# Patient Record
Sex: FEMALE | Race: WHITE | NOT HISPANIC OR LATINO
[De-identification: names, ages, dates, MRNs, and addresses within clinical notes are randomized per-mention and may not be internally consistent; named-entity substitution may affect disease eponyms.]

---

## 2017-07-06 ENCOUNTER — APPOINTMENT (OUTPATIENT)
Dept: OBGYN | Facility: CLINIC | Age: 67
End: 2017-07-06

## 2017-07-06 VITALS
DIASTOLIC BLOOD PRESSURE: 70 MMHG | BODY MASS INDEX: 40.72 KG/M2 | WEIGHT: 181 LBS | HEIGHT: 56 IN | SYSTOLIC BLOOD PRESSURE: 124 MMHG

## 2017-07-06 LAB — HEMOCCULT SP1 STL QL: NEGATIVE

## 2017-07-06 RX ORDER — RALOXIFENE HYDROCHLORIDE 60 MG/1
TABLET ORAL
Refills: 0 | Status: ACTIVE | COMMUNITY

## 2017-07-31 ENCOUNTER — RX RENEWAL (OUTPATIENT)
Age: 67
End: 2017-07-31

## 2018-01-24 ENCOUNTER — RX RENEWAL (OUTPATIENT)
Age: 68
End: 2018-01-24

## 2018-03-09 ENCOUNTER — OUTPATIENT (OUTPATIENT)
Dept: OUTPATIENT SERVICES | Facility: HOSPITAL | Age: 68
LOS: 1 days | End: 2018-03-09
Payer: MEDICARE

## 2018-03-09 ENCOUNTER — APPOINTMENT (OUTPATIENT)
Dept: MRI IMAGING | Facility: CLINIC | Age: 68
End: 2018-03-09
Payer: MEDICARE

## 2018-03-09 DIAGNOSIS — Z90.710 ACQUIRED ABSENCE OF BOTH CERVIX AND UTERUS: Chronic | ICD-10-CM

## 2018-03-09 DIAGNOSIS — Z98.89 OTHER SPECIFIED POSTPROCEDURAL STATES: Chronic | ICD-10-CM

## 2018-03-09 DIAGNOSIS — D32.0 BENIGN NEOPLASM OF CEREBRAL MENINGES: Chronic | ICD-10-CM

## 2018-03-09 DIAGNOSIS — Z00.8 ENCOUNTER FOR OTHER GENERAL EXAMINATION: ICD-10-CM

## 2018-03-09 DIAGNOSIS — Z96.659 PRESENCE OF UNSPECIFIED ARTIFICIAL KNEE JOINT: Chronic | ICD-10-CM

## 2018-03-09 PROCEDURE — A9585: CPT

## 2018-03-09 PROCEDURE — 70553 MRI BRAIN STEM W/O & W/DYE: CPT | Mod: 26

## 2018-03-09 PROCEDURE — 70553 MRI BRAIN STEM W/O & W/DYE: CPT

## 2018-05-01 ENCOUNTER — RX RENEWAL (OUTPATIENT)
Age: 68
End: 2018-05-01

## 2018-10-08 ENCOUNTER — OUTPATIENT (OUTPATIENT)
Dept: OUTPATIENT SERVICES | Facility: HOSPITAL | Age: 68
LOS: 1 days | End: 2018-10-08
Payer: MEDICARE

## 2018-10-08 ENCOUNTER — APPOINTMENT (OUTPATIENT)
Dept: MRI IMAGING | Facility: CLINIC | Age: 68
End: 2018-10-08
Payer: MEDICARE

## 2018-10-08 DIAGNOSIS — Z98.89 OTHER SPECIFIED POSTPROCEDURAL STATES: Chronic | ICD-10-CM

## 2018-10-08 DIAGNOSIS — D32.0 BENIGN NEOPLASM OF CEREBRAL MENINGES: Chronic | ICD-10-CM

## 2018-10-08 DIAGNOSIS — Z00.8 ENCOUNTER FOR OTHER GENERAL EXAMINATION: ICD-10-CM

## 2018-10-08 DIAGNOSIS — Z96.659 PRESENCE OF UNSPECIFIED ARTIFICIAL KNEE JOINT: Chronic | ICD-10-CM

## 2018-10-08 DIAGNOSIS — Z90.710 ACQUIRED ABSENCE OF BOTH CERVIX AND UTERUS: Chronic | ICD-10-CM

## 2018-10-08 PROCEDURE — 70551 MRI BRAIN STEM W/O DYE: CPT

## 2018-10-08 PROCEDURE — 70551 MRI BRAIN STEM W/O DYE: CPT | Mod: 26

## 2019-01-25 ENCOUNTER — MEDICATION RENEWAL (OUTPATIENT)
Age: 69
End: 2019-01-25

## 2019-02-25 ENCOUNTER — RX RENEWAL (OUTPATIENT)
Age: 69
End: 2019-02-25

## 2019-03-12 ENCOUNTER — APPOINTMENT (OUTPATIENT)
Dept: OBGYN | Facility: CLINIC | Age: 69
End: 2019-03-12
Payer: MEDICARE

## 2019-03-12 VITALS
DIASTOLIC BLOOD PRESSURE: 76 MMHG | HEIGHT: 56 IN | BODY MASS INDEX: 39.37 KG/M2 | WEIGHT: 175 LBS | SYSTOLIC BLOOD PRESSURE: 112 MMHG

## 2019-03-12 DIAGNOSIS — M85.80 OTHER SPECIFIED DISORDERS OF BONE DENSITY AND STRUCTURE, UNSPECIFIED SITE: ICD-10-CM

## 2019-03-12 LAB
BILIRUB UR QL STRIP: NORMAL
CLARITY UR: CLEAR
COLLECTION METHOD: NORMAL
GLUCOSE UR-MCNC: NORMAL
HCG UR QL: 0.2 EU/DL
HEMOCCULT SP1 STL QL: NEGATIVE
HGB UR QL STRIP.AUTO: NORMAL
KETONES UR-MCNC: ABNORMAL
LEUKOCYTE ESTERASE UR QL STRIP: NORMAL
NITRITE UR QL STRIP: NORMAL
PH UR STRIP: 5.5
PROT UR STRIP-MCNC: NORMAL
SP GR UR STRIP: 1.01

## 2019-03-12 PROCEDURE — 81003 URINALYSIS AUTO W/O SCOPE: CPT | Mod: QW

## 2019-03-12 PROCEDURE — 99214 OFFICE O/P EST MOD 30 MIN: CPT

## 2019-03-12 PROCEDURE — 82270 OCCULT BLOOD FECES: CPT

## 2019-03-12 RX ORDER — OMEPRAZOLE MAGNESIUM 20 MG/1
20 CAPSULE, DELAYED RELEASE ORAL
Refills: 0 | Status: ACTIVE | COMMUNITY

## 2019-03-12 RX ORDER — ASPIRIN ENTERIC COATED TABLETS 81 MG 81 MG/1
81 TABLET, DELAYED RELEASE ORAL DAILY
Qty: 90 | Refills: 3 | Status: ACTIVE | COMMUNITY
Start: 1900-01-01 | End: 1900-01-01

## 2019-03-12 RX ORDER — CA/D3/MAG OX/ZINC/COP/MANG/BOR 600 MG-800
250 MCG TABLET,CHEWABLE ORAL
Refills: 0 | Status: ACTIVE | COMMUNITY
Start: 2019-03-12

## 2019-03-12 NOTE — HISTORY OF PRESENT ILLNESS
[1 Year Ago] : 1 year ago [Fair] : being in fair health [Healthy Diet] : a healthy diet [Weight Concerns] : weight concens [Last Mammogram ___] : Last Mammogram was [unfilled] [Last Bone Density ___] : Last bone density studies [unfilled] [Last Colonoscopy ___] : Last colonoscopy [unfilled] [Last Pap ___] : Last cervical pap smear was [unfilled] [Postmenopausal] : is postmenopausal [Definite:  ___ (Date)] : the last menstrual period was [unfilled] [Currently In Menopause] : currently in menopause [Experiencing Menopausal Sxs] : experiencing menopausal symptoms [Regular Exercise] : not exercising regularly [Hot Flashes] : no hot flashes [Night Sweats] : no night sweats [FreeTextEntry8] : NOT S/A WITH  [Sexually Active] : is not sexually active [de-identified] : , IS 10 YEARS OLDER

## 2019-03-12 NOTE — PHYSICAL EXAM
[Awake] : awake [Alert] : alert [Soft] : soft [Obese] : obese [Oriented x3] : oriented to person, place, and time [Normal] : urethra [No Bleeding] : there was no active vaginal bleeding [Absent] : absent [Uterine Adnexae] : were not tender and not enlarged [Nl Sphincter Tone] : normal sphincter tone [External Hemorrhoid] : an external hemorrhoid [Acute Distress] : no acute distress [Mass] : no breast mass [Nipple Discharge] : no nipple discharge [Axillary LAD] : no axillary lymphadenopathy [Tender] : non tender [Vulvar Atrophy] : vulvar atrophy [Atrophy] : atrophy [Occult Blood] : occult blood test from digital rectal exam was negative [FreeTextEntry4] : NARROW SPEC USED, UNCOMFORTABLE

## 2019-03-18 LAB — CYTOLOGY CVX/VAG DOC THIN PREP: NORMAL

## 2019-05-01 ENCOUNTER — RX RENEWAL (OUTPATIENT)
Age: 69
End: 2019-05-01

## 2019-07-16 ENCOUNTER — RX RENEWAL (OUTPATIENT)
Age: 69
End: 2019-07-16

## 2019-12-03 ENCOUNTER — APPOINTMENT (OUTPATIENT)
Dept: MRI IMAGING | Facility: CLINIC | Age: 69
End: 2019-12-03
Payer: MEDICARE

## 2019-12-03 ENCOUNTER — OUTPATIENT (OUTPATIENT)
Dept: OUTPATIENT SERVICES | Facility: HOSPITAL | Age: 69
LOS: 1 days | End: 2019-12-03

## 2019-12-03 DIAGNOSIS — Z98.89 OTHER SPECIFIED POSTPROCEDURAL STATES: Chronic | ICD-10-CM

## 2019-12-03 DIAGNOSIS — Z90.710 ACQUIRED ABSENCE OF BOTH CERVIX AND UTERUS: Chronic | ICD-10-CM

## 2019-12-03 DIAGNOSIS — Z00.8 ENCOUNTER FOR OTHER GENERAL EXAMINATION: ICD-10-CM

## 2019-12-03 DIAGNOSIS — D32.0 BENIGN NEOPLASM OF CEREBRAL MENINGES: Chronic | ICD-10-CM

## 2019-12-03 DIAGNOSIS — Z96.659 PRESENCE OF UNSPECIFIED ARTIFICIAL KNEE JOINT: Chronic | ICD-10-CM

## 2019-12-03 PROCEDURE — 70553 MRI BRAIN STEM W/O & W/DYE: CPT | Mod: 26

## 2020-11-18 ENCOUNTER — APPOINTMENT (OUTPATIENT)
Dept: NEUROSURGERY | Facility: CLINIC | Age: 70
End: 2020-11-18
Payer: MEDICARE

## 2020-11-18 VITALS
HEART RATE: 76 BPM | HEIGHT: 58 IN | TEMPERATURE: 97.7 F | OXYGEN SATURATION: 97 % | DIASTOLIC BLOOD PRESSURE: 69 MMHG | SYSTOLIC BLOOD PRESSURE: 152 MMHG

## 2020-11-18 PROCEDURE — 99204 OFFICE O/P NEW MOD 45 MIN: CPT

## 2020-11-24 ENCOUNTER — APPOINTMENT (OUTPATIENT)
Dept: MRI IMAGING | Facility: CLINIC | Age: 70
End: 2020-11-24
Payer: MEDICARE

## 2020-11-24 ENCOUNTER — OUTPATIENT (OUTPATIENT)
Dept: OUTPATIENT SERVICES | Facility: HOSPITAL | Age: 70
LOS: 1 days | End: 2020-11-24

## 2020-11-24 DIAGNOSIS — Z98.89 OTHER SPECIFIED POSTPROCEDURAL STATES: Chronic | ICD-10-CM

## 2020-11-24 DIAGNOSIS — Z90.710 ACQUIRED ABSENCE OF BOTH CERVIX AND UTERUS: Chronic | ICD-10-CM

## 2020-11-24 DIAGNOSIS — D32.9 BENIGN NEOPLASM OF MENINGES, UNSPECIFIED: ICD-10-CM

## 2020-11-24 DIAGNOSIS — D32.0 BENIGN NEOPLASM OF CEREBRAL MENINGES: Chronic | ICD-10-CM

## 2020-11-24 DIAGNOSIS — Z96.659 PRESENCE OF UNSPECIFIED ARTIFICIAL KNEE JOINT: Chronic | ICD-10-CM

## 2020-11-24 PROCEDURE — 70553 MRI BRAIN STEM W/O & W/DYE: CPT | Mod: 26

## 2021-01-27 NOTE — HISTORY OF PRESENT ILLNESS
[FreeTextEntry1] : residual meningioma [de-identified] : 71 yo right handed female  presents to the office for a neurosurgical consultation for residual meningioma. She underwent resection of posterior falcine meningioma in 2003. She had Gamma Knife to posterior falcine meningioma 9/24/2015 by Dr. Weathers. \par MRI 10/2016 similar appearing enhancing right parietal parasagittal extra axial mass consistent with meningioma as compared to MRI brain 5/25/2016\par \par MRI 2019 This residual area of enhancing soft tissue measures approximately 2.2 x 1.6 cm and previously measured approximately 1.8 x 1.5 cm. This is likely compatible with residual tumor. Pt states she feels well without neurological complaints. \par \par Plan: MRI w wo fabiano

## 2021-01-27 NOTE — PHYSICAL EXAM
[General Appearance - Alert] : alert [General Appearance - In No Acute Distress] : in no acute distress [Oriented To Time, Place, And Person] : oriented to person, place, and time [Impaired Insight] : insight and judgment were intact [Affect] : the affect was normal [Person] : oriented to person [Place] : oriented to place [Time] : oriented to time [Short Term Intact] : short term memory intact [Remote Intact] : remote memory intact [Span Intact] : the attention span was normal [Concentration Intact] : normal concentrating ability [Fluency] : fluency intact [Comprehension] : comprehension intact [Current Events] : adequate knowledge of current events [Past History] : adequate knowledge of personal past history [Vocabulary] : adequate range of vocabulary [Cranial Nerves Oculomotor (III)] : extraocular motion intact [Cranial Nerves Trigeminal (V)] : facial sensation intact symmetrically [Cranial Nerves Facial (VII)] : face symmetrical [Cranial Nerves Vestibulocochlear (VIII)] : hearing was intact bilaterally [Cranial Nerves Glossopharyngeal (IX)] : tongue and palate midline [Cranial Nerves Accessory (XI - Cranial And Spinal)] : head turning and shoulder shrug symmetric [Cranial Nerves Hypoglossal (XII)] : there was no tongue deviation with protrusion [Motor Tone] : muscle tone was normal in all four extremities [Motor Strength] : muscle strength was normal in all four extremities [No Muscle Atrophy] : normal bulk in all four extremities [Motor Handedness Right-Handed] : the patient is right hand dominant [Sensation Tactile Decrease] : light touch was intact [Balance] : balance was intact [Extraocular Movements] : extraocular movements were intact [Outer Ear] : the ears and nose were normal in appearance [] : no respiratory distress [Respiration, Rhythm And Depth] : normal respiratory rhythm and effort [Exaggerated Use Of Accessory Muscles For Inspiration] : no accessory muscle use [Heart Rate And Rhythm] : heart rate was normal and rhythm regular [Abnormal Walk] : normal gait [Involuntary Movements] : no involuntary movements were seen [Skin Color & Pigmentation] : normal skin color and pigmentation [Skin Turgor] : normal skin turgor [Past-pointing] : there was no past-pointing [Tremor] : no tremor present

## 2021-01-27 NOTE — ASSESSMENT
[FreeTextEntry1] : 2020\par \par \par \par Re:	Ileana Mcmullen \par :	1950\par \par The patient is a 70-year-old right-handed female.  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 James Abraham at [facility].  The tumor was initially an incidental finding, but it progressively enlarged.  In , after observation, the patient was seen by Dr. Ron Tim and underwent stereotactic radiosurgery by Dr. Weathers.  A dose of 14 Gy was delivered to her recurrent right parasagittal tumor.  The patient underwent imaging in  and , and in 2019 the tumor measured 2.2 x 1.6, and it previously measured 1.8 x 1.5.  She is here today in follow-up.  As mentioned, the last image was a year ago.  The reason she is not following with \par Dr. Tim is geographic.  She lives near here, and she wants to follow closer to home. \par \par She has a past medical history notable for hypothyroidism, hiatal hernia, and osteoarthritis.  She had a thyroidectomy in , 3 C-sections, bilateral carpal tunnel surgery, bilateral salpingo-oophorectomies in , gallbladder operation in , and a renal cyst in .  She is on levothyroxine, Neurontin, Prilosec, and Evista.  She is neurologically intact.\par \par IMPRESSION: No decision can be made here until a follow-up MRI is performed.  She will obtain an MRI with and without gadolinium and return to the office for decision making regarding the recurrent tumor.  This was explained to the patient and her son in detail.  \par \par I will see her next week.\par \par \par \par Cortez Durham M.D., F.A.C.S.\par [facility]\par

## 2021-03-03 ENCOUNTER — APPOINTMENT (OUTPATIENT)
Dept: NEUROSURGERY | Facility: CLINIC | Age: 71
End: 2021-03-03
Payer: MEDICARE

## 2021-03-03 PROCEDURE — 99442: CPT

## 2021-03-03 NOTE — REASON FOR VISIT
[Home] : at home, [unfilled] , at the time of the visit. [Medical Office: ([facility])___] : at the medical office located in  [Verbal consent obtained from patient] : the patient, [unfilled] [Follow-Up: _____] : a [unfilled] follow-up visit

## 2021-03-04 NOTE — ASSESSMENT
[FreeTextEntry1] : March 3, 2021\par \par \par \par Re:	Elis Tamez \par :	1950\par \par I had a phone conversation with the patient’s daughter with the patient in the background.  The patient has been stable; however, she was hospitalized in 2020 in [place] for 11 days or more with COVID pneumonia.  She is now stable.  I reviewed an MRI performed 2020, that serves as a follow-up to her right parafalcine meningioma.  I reviewed the images, and there may be an extremely small change by a millimeter in the measurements of this.  It is essentially the same.  It is read as “similar to minimally enlarged when compared to 2019.  \par \par I certainly see no indication to operate on this meningioma, as she was operated on remotely by Dr. Jones and then had Gamma Knife by Dr. Tim.   I will see the patient and her daughter in 2021 with follow-up imaging with and without gadolinium.     \par \par \par \par Cortez Durham M.D., F.A.C.S.\par [facility]\par \par

## 2021-03-04 NOTE — HISTORY OF PRESENT ILLNESS
[FreeTextEntry1] : Ms. Ileana Tamez is present for telehealth visit follow up for residual meningioma.\par She underwent resection of posterior falcine meningioma in 2003. She had Gamma Knife to posterior falcine meningioma 9/24/2015 by Dr. Weathers. \par MRI 10/2016 similar appearing enhancing right parietal parasagittal extra axial mass consistent with meningioma as compared to MRI brain 5/25/2016\par \par MRI 2019 This residual area of enhancing soft tissue measures approximately 2.2 x 1.6 cm and previously measured approximately 1.8 x 1.5 cm. This is likely compatible with residual tumor.\par MRI imaging 12/2020 shows a stable lesion. Pt does not have symptomatology. from this lesion. We will continue to manage conservatively.\par \par Plan MRI with fu appt November 2021\par \par

## 2021-03-18 ENCOUNTER — RX RENEWAL (OUTPATIENT)
Age: 71
End: 2021-03-18

## 2021-04-26 ENCOUNTER — APPOINTMENT (OUTPATIENT)
Dept: OBGYN | Facility: CLINIC | Age: 71
End: 2021-04-26

## 2021-06-04 ENCOUNTER — RX RENEWAL (OUTPATIENT)
Age: 71
End: 2021-06-04

## 2021-09-02 ENCOUNTER — NON-APPOINTMENT (OUTPATIENT)
Age: 71
End: 2021-09-02

## 2021-09-02 ENCOUNTER — APPOINTMENT (OUTPATIENT)
Dept: OBGYN | Facility: CLINIC | Age: 71
End: 2021-09-02
Payer: MEDICARE

## 2021-09-02 VITALS — HEIGHT: 58 IN | DIASTOLIC BLOOD PRESSURE: 70 MMHG | SYSTOLIC BLOOD PRESSURE: 122 MMHG

## 2021-09-02 DIAGNOSIS — Z01.419 ENCOUNTER FOR GYNECOLOGICAL EXAMINATION (GENERAL) (ROUTINE) W/OUT ABNORMAL FINDINGS: ICD-10-CM

## 2021-09-02 DIAGNOSIS — R35.0 FREQUENCY OF MICTURITION: ICD-10-CM

## 2021-09-02 DIAGNOSIS — Z12.11 ENCOUNTER FOR SCREENING FOR MALIGNANT NEOPLASM OF COLON: ICD-10-CM

## 2021-09-02 DIAGNOSIS — Z12.72 ENCOUNTER FOR SCREENING FOR MALIGNANT NEOPLASM OF VAGINA: ICD-10-CM

## 2021-09-02 DIAGNOSIS — Z12.39 ENCOUNTER FOR OTHER SCREENING FOR MALIGNANT NEOPLASM OF BREAST: ICD-10-CM

## 2021-09-02 DIAGNOSIS — R35.1 NOCTURIA: ICD-10-CM

## 2021-09-02 DIAGNOSIS — R10.2 PELVIC AND PERINEAL PAIN: ICD-10-CM

## 2021-09-02 PROCEDURE — 99397 PER PM REEVAL EST PAT 65+ YR: CPT

## 2021-09-02 NOTE — PHYSICAL EXAM
[Appropriately responsive] : appropriately responsive [Alert] : alert [No Acute Distress] : no acute distress [No Murmurs] : no murmurs [Soft] : soft [Non-tender] : non-tender [Non-distended] : non-distended [No HSM] : No HSM [No Lesions] : no lesions [No Mass] : no mass [Oriented x3] : oriented x3 [FreeTextEntry7] : LARGE PANNUS [Examination Of The Breasts] : a normal appearance [No Masses] : no breast masses were palpable [Vulvar Atrophy] : vulvar atrophy [Labia Majora] : normal [Labia Minora] : normal [Normal] : normal [Absent] : absent [Uterine Adnexae] : non-palpable [No Tenderness] : no tenderness [Nl Sphincter Tone] : normal sphincter tone [FreeTextEntry4] : WELL HEALED CUFF [FreeTextEntry6] : LIMITED DUE TO HABITUS [FreeTextEntry9] : GUAIAC NEGATIVE

## 2021-09-02 NOTE — HISTORY OF PRESENT ILLNESS
[FreeTextEntry1] : HAD COVID19 DISEASE 11/2020, WAS HOSPITALIZED X 2 WEEKS, DID WELL.  VACCINATED X 2.\par \par HAD ANTIBIOTICS FOR DENTAL ABSCESSES IN [place], NEEDED ANTIBIOTICS FOR GI UPSET, DIARRHEA.  STILL C/O SOME LOWER ABDOMINAL PAIN.  UNCLEAR IF CT DONE OR PELVIC SONOGRAM.\par \par DISCUSSED PRECAUTIONS AGAINST COVID19, INCLUDING MASK WEARING, SOCIAL DISTANCING AND HAND WASHING.\par  [Patient reported PAP Smear was normal] : Patient reported PAP Smear was normal [Mammogramdate] : 4/2021 [TextBox_19] : NOT ON CHART [PapSmeardate] : 3/2019 [BoneDensityDate] : 4/2021 [TextBox_37] : NOT ON CHART [No] : Patient does not have concerns regarding sex [Previously active] : previously active [FreeTextEntry2] :

## 2021-09-02 NOTE — PLAN
[FreeTextEntry1] : F/U PELVIC SONOGRAM IF NOT DONE, FOR LOWER ABDOMINAL PAIN.  F/U UA C&S FOR NOCTURIA, FREQUENCY, R/O UTI.

## 2021-09-08 LAB
APPEARANCE: CLEAR
BACTERIA UR CULT: NORMAL
BACTERIA: NEGATIVE
BILIRUBIN URINE: NEGATIVE
BLOOD URINE: NEGATIVE
COLOR: NORMAL
CYTOLOGY CVX/VAG DOC THIN PREP: ABNORMAL
GLUCOSE QUALITATIVE U: NEGATIVE
HYALINE CASTS: 0 /LPF
KETONES URINE: NEGATIVE
LEUKOCYTE ESTERASE URINE: NEGATIVE
MICROSCOPIC-UA: NORMAL
NITRITE URINE: NEGATIVE
PH URINE: 6.5
PROTEIN URINE: NEGATIVE
RED BLOOD CELLS URINE: 4 /HPF
SPECIFIC GRAVITY URINE: 1.01
SQUAMOUS EPITHELIAL CELLS: 1 /HPF
UROBILINOGEN URINE: NORMAL
WHITE BLOOD CELLS URINE: 2 /HPF

## 2021-09-14 DIAGNOSIS — R31.29 OTHER MICROSCOPIC HEMATURIA: ICD-10-CM

## 2021-11-23 ENCOUNTER — OUTPATIENT (OUTPATIENT)
Dept: OUTPATIENT SERVICES | Facility: HOSPITAL | Age: 71
LOS: 1 days | End: 2021-11-23

## 2021-11-23 ENCOUNTER — APPOINTMENT (OUTPATIENT)
Dept: MRI IMAGING | Facility: CLINIC | Age: 71
End: 2021-11-23
Payer: MEDICARE

## 2021-11-23 DIAGNOSIS — Z98.89 OTHER SPECIFIED POSTPROCEDURAL STATES: Chronic | ICD-10-CM

## 2021-11-23 DIAGNOSIS — Z90.710 ACQUIRED ABSENCE OF BOTH CERVIX AND UTERUS: Chronic | ICD-10-CM

## 2021-11-23 DIAGNOSIS — D32.9 BENIGN NEOPLASM OF MENINGES, UNSPECIFIED: ICD-10-CM

## 2021-11-23 DIAGNOSIS — Z96.659 PRESENCE OF UNSPECIFIED ARTIFICIAL KNEE JOINT: Chronic | ICD-10-CM

## 2021-11-23 DIAGNOSIS — D32.0 BENIGN NEOPLASM OF CEREBRAL MENINGES: Chronic | ICD-10-CM

## 2021-11-23 PROCEDURE — 70553 MRI BRAIN STEM W/O & W/DYE: CPT | Mod: 26

## 2021-12-03 ENCOUNTER — NON-APPOINTMENT (OUTPATIENT)
Age: 71
End: 2021-12-03

## 2023-10-30 ENCOUNTER — NON-APPOINTMENT (OUTPATIENT)
Age: 73
End: 2023-10-30

## 2023-11-11 ENCOUNTER — APPOINTMENT (OUTPATIENT)
Dept: MRI IMAGING | Facility: CLINIC | Age: 73
End: 2023-11-11
Payer: MEDICARE

## 2023-11-11 ENCOUNTER — OUTPATIENT (OUTPATIENT)
Dept: OUTPATIENT SERVICES | Facility: HOSPITAL | Age: 73
LOS: 1 days | End: 2023-11-11

## 2023-11-11 DIAGNOSIS — Z98.89 OTHER SPECIFIED POSTPROCEDURAL STATES: Chronic | ICD-10-CM

## 2023-11-11 DIAGNOSIS — Z90.710 ACQUIRED ABSENCE OF BOTH CERVIX AND UTERUS: Chronic | ICD-10-CM

## 2023-11-11 DIAGNOSIS — Z96.659 PRESENCE OF UNSPECIFIED ARTIFICIAL KNEE JOINT: Chronic | ICD-10-CM

## 2023-11-11 DIAGNOSIS — D32.0 BENIGN NEOPLASM OF CEREBRAL MENINGES: Chronic | ICD-10-CM

## 2023-11-11 DIAGNOSIS — D32.9 BENIGN NEOPLASM OF MENINGES, UNSPECIFIED: ICD-10-CM

## 2023-11-11 PROCEDURE — 70553 MRI BRAIN STEM W/O & W/DYE: CPT | Mod: 26

## 2023-11-28 PROBLEM — D32.9 BENIGN MENINGIOMA: Status: ACTIVE | Noted: 2020-11-18

## 2023-11-29 ENCOUNTER — APPOINTMENT (OUTPATIENT)
Dept: NEUROSURGERY | Facility: CLINIC | Age: 73
End: 2023-11-29
Payer: MEDICARE

## 2023-11-29 VITALS
TEMPERATURE: 98.9 F | BODY MASS INDEX: 36.73 KG/M2 | OXYGEN SATURATION: 96 % | HEIGHT: 58 IN | WEIGHT: 175 LBS | SYSTOLIC BLOOD PRESSURE: 154 MMHG | DIASTOLIC BLOOD PRESSURE: 76 MMHG | HEART RATE: 68 BPM

## 2023-11-29 DIAGNOSIS — D32.9 BENIGN NEOPLASM OF MENINGES, UNSPECIFIED: ICD-10-CM

## 2023-11-29 PROCEDURE — 99204 OFFICE O/P NEW MOD 45 MIN: CPT
